# Patient Record
Sex: MALE | Race: BLACK OR AFRICAN AMERICAN | NOT HISPANIC OR LATINO | Employment: UNEMPLOYED | URBAN - METROPOLITAN AREA
[De-identification: names, ages, dates, MRNs, and addresses within clinical notes are randomized per-mention and may not be internally consistent; named-entity substitution may affect disease eponyms.]

---
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
## 2021-04-21 ENCOUNTER — TELEPHONE (OUTPATIENT)
Dept: PEDIATRICS CLINIC | Facility: CLINIC | Age: 21
End: 2021-04-21

## 2021-05-31 NOTE — TELEPHONE ENCOUNTER
05/30/21 9:16 PM     Thank you for your request  Your request has been received, reviewed, and the patient chart updated  The PCP has successfully been removed with a patient attribution note  This message will now be completed      Thank you  Dhaval Rockwell

## 2023-08-16 NOTE — MISCELLANEOUS
Addended by: CLEMENTINA LINDO on: 8/16/2023 12:14 PM     Modules accepted: Orders     Message  Return to work or school:      He is able to return to school on 11/17/2016          Signatures   Electronically signed by : Matias Horne DO; Nov 15 2016  7:04PM EST                       (Author)

## 2024-03-04 NOTE — LETTER
August 9, 2020     Patient: Griselda Cardenas   YOB: 2000   Date of Visit: 8/9/2020       To Whom It May Concern: It is my medical opinion that Griselda Cardenas should remain out of work until lab test result returns  If you have any questions or concerns, please don't hesitate to call           Sincerely,        Claudia Dior MD    CC: No Recipients Health Care Proxy (HCP)